# Patient Record
Sex: FEMALE | Race: OTHER | HISPANIC OR LATINO | Employment: UNEMPLOYED | URBAN - METROPOLITAN AREA
[De-identification: names, ages, dates, MRNs, and addresses within clinical notes are randomized per-mention and may not be internally consistent; named-entity substitution may affect disease eponyms.]

---
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
## 2020-11-05 NOTE — DISCHARGE INSTRUCTIONS
Náusea y vómito severo, cuidados ambulatorios   INFORMACIÓN GENERAL:   La náusea y vómito severo  empieza de repente, empeora rápidamente y dura un corto periodo de [place]  La náusea y el vómito pueden ser causados por el embarazo, el alcohol, jayson infección o medicamentos  Síntomas relacionados comunes incluyen los siguientes:   · Fiebre    · Inflamación abdominal    · Dolor, sensibilidad o un bulto en el abdomen    · Sonidos salpicantes que se escuchan en wood estómago cuando usted se mueve  Busque cuidados inmediatos para los siguientes síntomas:   · Wili en wood vómito o heces    · Dolor repentino y severo en el pecho y parte superior del abdomen después de jimbo vomitado muy jeannette    · Mareos, sequedad en la boca y sed    · Muy poca orina o ausencia completa de la misma    · Debilidad muscular, calambres musculares y dificultad para respirar    · Latidos cardíacos más rápidos de lo normal    · Vómito por más de 50 horas  El tratamiento para la náusea y el vómito severo  puede incluir medicamentos para calmar wood estómago y parar el vómito  Es probable que usted necesite de líquidos intravenosos si está deshidratado  Maneje wood náusea y vómito:   · [place] líquidos según indicaciones, para prevenir la deshidratación  Pregunte cuánto líquido kerline [employer] gómez y cuáles líquidos son mejores para usted  Es probable que usted necesite kerline un líquido de rehidratación oral  [place] líquido contiene agua, sales y azúcares que son todos necesarios para reemplazar los líquidos que el cuerpo ha perdido  Pregunte qué tipo de líquidos de rehidratación oral kelrine, cuánto kerline y dónde conseguirlos  · Consuma comidas pequeñas con más frecuencia  Consuma cantidades pequeñas de alimentos cada 2 o 3 horas aunque no sienta hambre  Los alimentos en wood estómago pueden llegar a [employer]  · Evite el estrés  Busque formas de relajarse y [place] wood estrés   Los ricardo de heath debidos al estrés pueden incluso causar náusea y vómito  Descanse y [employer]  Programe jayson dixie con wood proveedor de silver según indicaciones:  Anote sravani preguntas para que las recuerde tony sravani citas de seguimiento  ACUERDOS SOBRE WOOD CUIDADO:   Usted tiene el derecho de participar en la planificación de wood cuidado  Aprenda todo lo que pueda sobre wood condición y claire darle tratamiento  Discuta con sravani médicos sravani opciones de tratamiento para juntos decidir el cuidado que usted quiere recibir  Usted siempre tiene el derecho a rechazar wood tratamiento  Esta información es sólo para uso en educación  Wood intención no es darle un consejo médico sobre enfermedades o tratamientos  Colsulte con wood Shantanu Kay farmacéutico antes de seguir cualquier régimen médico para saber si es seguro y efectivo para usted  © 2014 [address] is for End User's use only and may not be sold, redistributed or otherwise used for commercial purposes  All illustrations and images included in CareNotes® are the copyrighted property of A D A M , Inc  or Christina Oliva  Un embarazo,   LO QUE NECESITA SABER:   Un embarazo normal dura alrededor de 40 semanas  El primer trimestre dura desde wood último periodo hasta la semana 12 de [place]  El abundio trimestre se extiende desde la semana 13 de wood embarazo hasta la semana 23  El tercer trimestre se extiende desde la semana 24 de embarazo hasta que nazca wood bebé  Si usted conoce la fecha de wood último periodo, wood médico puede calcular la fecha de nacimiento de wood bebé  Es posible que usted de a zach a wood bebé en cualquier momento desde la semana 37 hasta 2 semanas después de la fecha calculada de [place]  INSTRUCCIONES SOBRE EL UMM HOSPITALARIA:   Regrese a la ricardo de emergencias si:   · Usted presenta un jeannette dolor de heath que no desaparece  · Usted tiene cambios en la visión nuevos o en aumento, claire visión borrosa o con manchas      · Usted tiene inflamación nueva o creciente en wood orlando o efren  · Usted tiene dolor o cólicos en el abdomen o la parte baja de la espalda  · Usted tiene sangrado vaginal   Comuníquese con wood médico o obstreta si:   · Usted tiene calambres, presión o tensión abdominal     · Usted tiene un cambio en la secreción vaginal     · Usted no puede retener alimentos ni líquidos y está perdiendo [place]  · Usted tiene escalofríos o fiebre  · Usted tiene comezón, ardor o dolor vaginal      · Usted tiene jayson secreción vaginal amarillenta, verdosa, jessica o de [employer]  · Usted tiene dolor o ardor al [place], orina menos de lo habitual o tiene [place] rosada o sanguinolenta  · Usted tiene preguntas o inquietudes acerca de wood condición o cuidado  Medicamentos:   · Las vitaminas prenatales  suministran parte de las vitaminas y minerales adicionales que usted necesita tony el [facility]  Las vitaminas prenatales también podrían ayudar a disminuir el riesgo de ciertos defectos de nacimiento  · [place] sravani medicamentos claire se le haya indicado  Consulte con wood médico si usted gayle que wood medicamento no le está ayudando o si presenta efectos secundarios  Infórmele si es alérgico a cualquier medicamento  Mantenga jayson lista actualizada de los [place], las vitaminas y los productos herbales que quyen  Incluya los siguientes datos de los medicamentos: cantidad, frecuencia y motivo de administración  Traiga con usted la lista o los envases de la píldoras a sravani citas de seguimiento  Lleve la lista de los medicamentos con usted en sudeep de jayson emergencia  Programe un control con wood médico u obstetra según lo indicado:  Vaya a todas sravani citas prenatales tony wood embarazo  Anote sravani preguntas para que se acuerde de hacerlas tony sravani visitas  Cambios corporales que pueden ocurrir tony wood embarazo:   · Los cambios en los senos  que usted Linsey Sherwood sensibilidad y cosquilleo tony la primera parte de wood [place]   Los senos se Dora Verduzco grandes  Es posible que necesite un sostén con soporte  Es posible que usted carmina Jerri Lint secreción delgada y REANNA, conocida claire calostro, que sale de sravani pezones tony el abundio trimestre  El calostro es un líquido que se convertirá en [place] alrededor de 3 días después de usted jimbo dado a zach  · Cambios en la piel y estrías  podrían ocurrir tony wood embarazo  Es posible que usted tenga marcas shoemaker, conocidas claire estrías, en wood piel  Las [employer] se desvanecen después del [facility]  Utilice crema si wood piel está seca y con comezón  La piel de wood [place], alrededor de los pezones y debajo de wood ombligo podría oscurecerse  La mayoría del [place], wood piel  Eliz a wood color normal después del nacimiento de wood bebé  · El malestar matutino  consiste en náuseas y vómitos que pueden ocurrir en cualquier momento del día  Evite los alimentos grasosos y picantes  Coma comidas pequeñas tony el día en vez de porciones grandes  El jengibre puede ayudar a [employer]  Consulte con wood médico acerca de otras formas para disminuir las náuseas y el vómito  · Acidez estomacal  puede ser causada por los cambios hormonales tony wood [facility]  El [place] en crecimiento puede empujar wood estómago hacia arriba y forzar ácido estomacal a acumularse dentro de wood esófago  [place] 4 o 5 comidas pequeñas cada día en vez de comidas grandes  Evite los alimentos picantes  Evite comer elliott antes de irse a la cama  · Estreñimiento  puede desarrollarse tony wood embarazo  Para tratar el estreñimiento, coma alimentos altos en fibra claire cereales con fibra, frijoles, frutas, verduras, panes integrales y [place]  Ana Paula Babe de [place] regular y tome suficiente agua  Es posible que wood médico sugiera un suplemento con fibra para ablandar sravani evacuaciones intestinales  Consulte con wood médico antes de usar cualquier medicamento para disminuir el estreñimiento      · Las hemorroides  son Genevive Bald grandes en Maritza Lout rectal  Pueden causar dolor, comezón y sangrado de color adams vivo en wood recto  Para disminuir el riesgo de hemorroides, prevenga el estreñimiento y no se esfuerce cuando tenga jayson evacuación intestinal  Si usted tiene hemorroides, sumérjase en jayson bañera con agua tibia para aliviar la incomodidad  Consulte con wood médico cómo puede tratar las hemorroides  · Los calambres y la hinchazón en las piernas  pueden ser causados por niveles bajos de calcio o por el peso adicional del Shobha Galeazzi  Eleve sravani piernas por encima del nivel de wood corazón para disminuir la hinchazón  Tony un calambre en la pierna, estire o de un masaje al [employer] que tiene el calambre  El calor puede ayudar a disminuir el dolor y los espasmos musculares  Aplique calor sobre el músculo por 20 a 30 minutos cada 2 horas por la cantidad de días que se le indique  · Dolor en la espalda  puede ocurrir a medida que wood bebé crece  No esté de pie por largos periodos de tiempo ni levante objetos pesados  Use jayson buena postura mientras esté de pie, se agache o se doble  Use zapatos de tacón bajo con un buen soporte  Descansar puede también ayudarla a aliviar el dolor de espalda  Pregunte a wood médico acerca de ejercicios que usted pueda hacer para fortalecer los músculos de wood espalda  Manténgase saludable tony wood embarazo:   · Consuma alimentos saludables y variados  Alimentos saludables incluyen frutas, verduras, panes de andressa integral, alimentos lácteos bajos en grasa, frijoles, corinne magras y pescado  [place] líquidos claire se le haya indicado  Pregunte cuánto líquido debe kerline cada día y cuáles líquidos son los más adecuados para usted  Limite el consumo de cafeína a menos de [address]  Limite el consumo de pescado a 2 porciones cada semana  Escoja pescado con concentraciones bajas de sukh claire atún ligero enlatado, camarón, cangrejo, salmón, bacalao o tilapia   No  coma pescado con concentraciones altas de sukh claire pez [place], pargo rayado y tiburón  · [address] [place]  Wood necesidad de ciertas vitaminas y [address] [facility], claire el ácido fólico, aumenta tony el [facility]  Las vitaminas prenatales proporcionan algunas de las vitaminas y minerales adicionales que usted necesita  Las vitaminas prenatales también podrían ayudar a disminuir el riesgo de ciertos defectos de nacimiento  · Pregunte cuánto peso usted debe aumentar tony wood embarazo  Demasiado aumento de peso o muy poco puede ser poco saludable para usted y wood bebé  · Consulte con wood médico acerca de hacer ejercicio  El ejercicio moderado puede ayudarla a mantenerse en forma  Wood médico la ayudará a planear un programa de ejercicios que sea seguro para usted tony wood [facility]  · No fume  Si usted fuma, nunca es demasiado tarde para dejar de hacerlo  Fumar aumenta el riesgo de aborto espontáneo y otros problemas de silver tony wood [facility]  Fumar puede causar que wood bebé nazca antes de tiempo o que pese menos al nacer  Solicite información a wood médico si usted necesita ayuda para dejar de fumar  · No consuma alcohol  El alcohol pasa de wood cuerpo al bebé a través de la placenta  Puede afectar el desarrollo del cerebro de wood bebé y provocar el síndrome de alcoholismo fetal (SAF)  FAS es un walter de condiciones que causan [address], de comportamiento y de crecimiento  · Consulte con wood médico antes de kerline cualquier medicamento  Muchos medicamentos pueden perjudicar a wood bebé si usted los quyen [address] [place] [address] Avenue  No tome ningún medicamento, vitaminas, hierbas o suplementos sin biju consultar con wood Taj Raring  use drogas ilegales o de la rodriguez (claire marihuana o cocaína) mientras está embarazada  Consejos de seguridad:   · Evite jacuzzis y saunas  No use un jacuzzi o un sauna mientras usted está embarazada, especialmente tony el primer trimestre   [address] y [facility] aumentan la temperatura de millard bebé y el riesgo de defectos de nacimiento  · Evite la toxoplasmosis  [place] es jayson infección causada por comer carne cruda o estar cerca del excremento de un jhon infectado  [place] puede causar malformaciones congénitas, aborto espontáneo y Jf Schein  Lávese las efren después de tocar carne cruda  Asegúrese de que la carne esté elizabeth cocida antes de comerla  Evite los huevos crudos y la [place]  Use guantes o pida que alguien la ayude a limpiar la caja de arena del jhon mientras usted Yasmeen Juarez  · Consulte con millard médico acerca de viajar  El [address] cómodo para viajar es tony el abundio trimestre  Pregunte a millard médico si usted puede viajar después de las 36 semanas  Es posible que no pueda viajar en avión después de las 36 [address]  También le puede recomendar que evite largos viajes por carretera  © 2017 [address] Information is for End User's use only and may not be sold, redistributed or otherwise used for commercial purposes  All illustrations and images included in CareNotes® are the copyrighted property of [employer]  or Christian Oliva  Esta información es sólo para uso en educación  Millard intención no es darle un consejo médico sobre enfermedades o tratamientos  Colsulte con millard Marybeth Every farmacéutico antes de seguir cualquier régimen médico para saber si es seguro y efectivo para usted  Was A Bandage Applied: Yes